# Patient Record
Sex: MALE | Race: WHITE
[De-identification: names, ages, dates, MRNs, and addresses within clinical notes are randomized per-mention and may not be internally consistent; named-entity substitution may affect disease eponyms.]

---

## 2018-07-08 ENCOUNTER — HOSPITAL ENCOUNTER (EMERGENCY)
Dept: HOSPITAL 89 - ER | Age: 53
Discharge: HOME | End: 2018-07-08
Payer: COMMERCIAL

## 2018-07-08 VITALS — SYSTOLIC BLOOD PRESSURE: 156 MMHG | DIASTOLIC BLOOD PRESSURE: 112 MMHG

## 2018-07-08 DIAGNOSIS — R00.0: ICD-10-CM

## 2018-07-08 DIAGNOSIS — S30.1XXA: Primary | ICD-10-CM

## 2018-07-08 LAB — PLATELET COUNT, AUTOMATED: 264 K/UL (ref 150–450)

## 2018-07-08 PROCEDURE — 84460 ALANINE AMINO (ALT) (SGPT): CPT

## 2018-07-08 PROCEDURE — 96375 TX/PRO/DX INJ NEW DRUG ADDON: CPT

## 2018-07-08 PROCEDURE — 82565 ASSAY OF CREATININE: CPT

## 2018-07-08 PROCEDURE — 83690 ASSAY OF LIPASE: CPT

## 2018-07-08 PROCEDURE — 82247 BILIRUBIN TOTAL: CPT

## 2018-07-08 PROCEDURE — 96374 THER/PROPH/DIAG INJ IV PUSH: CPT

## 2018-07-08 PROCEDURE — 82435 ASSAY OF BLOOD CHLORIDE: CPT

## 2018-07-08 PROCEDURE — 84450 TRANSFERASE (AST) (SGOT): CPT

## 2018-07-08 PROCEDURE — 74177 CT ABD & PELVIS W/CONTRAST: CPT

## 2018-07-08 PROCEDURE — 85025 COMPLETE CBC W/AUTO DIFF WBC: CPT

## 2018-07-08 PROCEDURE — 71260 CT THORAX DX C+: CPT

## 2018-07-08 PROCEDURE — 82947 ASSAY GLUCOSE BLOOD QUANT: CPT

## 2018-07-08 PROCEDURE — 84132 ASSAY OF SERUM POTASSIUM: CPT

## 2018-07-08 PROCEDURE — 82040 ASSAY OF SERUM ALBUMIN: CPT

## 2018-07-08 PROCEDURE — 99284 EMERGENCY DEPT VISIT MOD MDM: CPT

## 2018-07-08 PROCEDURE — 82374 ASSAY BLOOD CARBON DIOXIDE: CPT

## 2018-07-08 PROCEDURE — 84295 ASSAY OF SERUM SODIUM: CPT

## 2018-07-08 PROCEDURE — 93005 ELECTROCARDIOGRAM TRACING: CPT

## 2018-07-08 PROCEDURE — 84155 ASSAY OF PROTEIN SERUM: CPT

## 2018-07-08 PROCEDURE — 84520 ASSAY OF UREA NITROGEN: CPT

## 2018-07-08 PROCEDURE — 84075 ASSAY ALKALINE PHOSPHATASE: CPT

## 2018-07-08 PROCEDURE — 82310 ASSAY OF CALCIUM: CPT

## 2018-07-08 NOTE — ER REPORT
History and Physical


Time Seen By MD:  11:45


HPI/ROS


52 y/o male was helping move furniture when he fell into a solid object casuing 

an abdominal hematoma yesterday, No with pain. and nausea


Remainder of the 14 system rev:  Yes


Allergies:  


Coded Allergies:  


     Propoxyphene (Verified  Allergy, Mild, RASH, 10/12/10)


     Tramadol (Verified  Allergy, Mild, RASH, 10/12/10)


     Tetracycline (Verified  Adverse Reaction, Severe, NAUSEA VOMITING, 10/12/10

)


Uncoded Allergies:  


     INAPSINE (Adverse Reaction, Severe, RAGE, 7/1/09)


Home Meds


Active Scripts


Ibuprofen (IBUPROFEN) 600 Mg Tablet, 1 TAB PO Q6H for 10 Days, #30 TAB


   Prov:KAYLEE GILLIS MD         7/8/18


Oxycodone Hcl/Acetaminophen (OXYCODONE-ACETAMINOPHEN 5-325) 1 Each Tablet, 1 

EACH PO Q6H for 3 Days, #10 TAB


   Prov:KAYLEE GILLIS MD         7/8/18


Reported Medications


Oxycodone/Acetaminophen (OXYCODONE/ACETAMINOPHEN 5MG/325 MG) 5 Mg/325 Mg Tab, 1 

TAB PO Q4-6H, #30 0 Refills


   10/12/10


Ibuprofen (Motrin) 800 Mg Tab, 800 MG PO Q8H, 0 Refills


   10/12/10


Reviewed Nurses Notes:  Yes


Old Medical Records Reviewed:  Yes


Hx Substance Use Disorder:  No


Hx Alcohol Use:  Yes (RARE)


Constitutional





Physical Exam


A/Ox3 in NAD


CV: RRR n o m/r/g


Lungs: cta b/l


Abdomen: soft with ecchymoses to LUQ with TTP of LUQ and lower left ribs


MSK: no flank TTP





Medical Decision Making


Data Points


Laboratory





Hematology








Test


  7/8/18


12:00


 


Red Blood Count


  5.26 M/uL


(4.00-5.60)


 


Mean Corpuscular Volume


  93.6 fL


(80.0-96.0)


 


Mean Corpuscular Hemoglobin


  32.8 pg


(26.0-33.0)


 


Mean Corpuscular Hemoglobin


Concent 35.1 g/dL


(32.0-36.0)


 


Red Cell Distribution Width


  15.0 %


(11.5-14.5)


 


Mean Platelet Volume


  7.1 fL


(7.2-11.1)


 


Neutrophils (%) (Auto)


  64.5 %


(39.4-72.5)


 


Lymphocytes (%) (Auto)


  23.8 %


(17.6-49.6)


 


Monocytes (%) (Auto)


  11.2 %


(4.1-12.4)


 


Eosinophils (%) (Auto)


  0.3 %


(0.4-6.7)


 


Basophils (%) (Auto)


  0.2 %


(0.3-1.4)


 


Nucleated RBC Relative Count


(auto) 0.0 /100WBC 


 


 


Neutrophils # (Auto)


  6.3 K/uL


(2.0-7.4)


 


Lymphocytes # (Auto)


  2.3 K/uL


(1.3-3.6)


 


Monocytes # (Auto)


  1.1 K/uL


(0.3-1.0)


 


Eosinophils # (Auto)


  0.0 K/uL


(0.0-0.5)


 


Basophils # (Auto)


  0.0 K/uL


(0.0-0.1)


 


Nucleated RBC Absolute Count


(auto) 0.00 K/uL 


 


 


Peripheral Blood Smear No Y/N 


 


Sodium Level


  139 mmol/L


(137-145)


 


Potassium Level


  3.9 mmol/L


(3.5-5.0)


 


Chloride Level


  99 mmol/L


()


 


Carbon Dioxide Level


  29 mmol/L


(22-30)


 


Blood Urea Nitrogen


  14 mg/dl


(9-21)


 


Creatinine


  1.20 mg/dl


(0.66-1.25)


 


Glomerular Filtration Rate


Calc > 60.0 


 


 


Random Glucose


  95 mg/dl


()


 


Calcium Level


  9.0 mg/dl


(8.4-10.2)


 


Total Bilirubin


  0.6 mg/dl


(0.2-1.3)


 


Aspartate Amino Transf


(AST/SGOT) 35 U/L (0-35) 


 


 


Alanine Aminotransferase


(ALT/SGPT) 80 U/L (0-56) 


 


 


Alkaline Phosphatase 63 U/L (0-126) 


 


Total Protein


  7.1 g/dl


(6.3-8.2)


 


Albumin


  4.2 g/dl


(3.5-5.0)


 


Lipase


  260 U/L


()








Chemistry








Test


  7/8/18


12:00


 


White Blood Count


  9.8 k/uL


(4.5-11.0)


 


Red Blood Count


  5.26 M/uL


(4.00-5.60)


 


Hemoglobin


  17.3 g/dL


(14.0-18.0)


 


Hematocrit


  49.2 %


(42.0-52.0)


 


Mean Corpuscular Volume


  93.6 fL


(80.0-96.0)


 


Mean Corpuscular Hemoglobin


  32.8 pg


(26.0-33.0)


 


Mean Corpuscular Hemoglobin


Concent 35.1 g/dL


(32.0-36.0)


 


Red Cell Distribution Width


  15.0 %


(11.5-14.5)


 


Platelet Count


  264 K/uL


(150-450)


 


Mean Platelet Volume


  7.1 fL


(7.2-11.1)


 


Neutrophils (%) (Auto)


  64.5 %


(39.4-72.5)


 


Lymphocytes (%) (Auto)


  23.8 %


(17.6-49.6)


 


Monocytes (%) (Auto)


  11.2 %


(4.1-12.4)


 


Eosinophils (%) (Auto)


  0.3 %


(0.4-6.7)


 


Basophils (%) (Auto)


  0.2 %


(0.3-1.4)


 


Nucleated RBC Relative Count


(auto) 0.0 /100WBC 


 


 


Neutrophils # (Auto)


  6.3 K/uL


(2.0-7.4)


 


Lymphocytes # (Auto)


  2.3 K/uL


(1.3-3.6)


 


Monocytes # (Auto)


  1.1 K/uL


(0.3-1.0)


 


Eosinophils # (Auto)


  0.0 K/uL


(0.0-0.5)


 


Basophils # (Auto)


  0.0 K/uL


(0.0-0.1)


 


Nucleated RBC Absolute Count


(auto) 0.00 K/uL 


 


 


Peripheral Blood Smear No Y/N 


 


Glomerular Filtration Rate


Calc > 60.0 


 


 


Calcium Level


  9.0 mg/dl


(8.4-10.2)


 


Total Bilirubin


  0.6 mg/dl


(0.2-1.3)


 


Aspartate Amino Transf


(AST/SGOT) 35 U/L (0-35) 


 


 


Alanine Aminotransferase


(ALT/SGPT) 80 U/L (0-56) 


 


 


Alkaline Phosphatase 63 U/L (0-126) 


 


Total Protein


  7.1 g/dl


(6.3-8.2)


 


Albumin


  4.2 g/dl


(3.5-5.0)


 


Lipase


  260 U/L


()











ED Course/Re-evaluation


ED Course


In route from [facility] to [place]. Fell against object while moving 

furniture yesterday and sustained abdominal wall hematoma. CT scan/labs 

otherwise normal. No evidence of rib/splenic injury. Supportive care meds given.


Decision to Disposition Date:  Jul 8, 2018


Decision to Disposition Time:  14:40





Depart


Departure


Latest Vital Signs





Impression:  


 Primary Impression:  


 Abdominal wall contusion


Condition:  Improved


Disposition:  HOME OR SELF-CARE


New Scripts


Ibuprofen (IBUPROFEN) 600 Mg Tablet


1 TAB PO Q6H for 10 Days, #30 TAB


   Prov: KAYLEE GILLIS MD         7/8/18 


Oxycodone Hcl/Acetaminophen (OXYCODONE-ACETAMINOPHEN 5-325) 1 Each Tablet


1 EACH PO Q6H for 3 Days, #10 TAB


   Prov: KAYLEE GILLIS MD         7/8/18


Patient Instructions:  Rib Contusion (ED)





Additional Instructions:  


Obtain an MRI of your pancreas in the next 2-3 months. Avoid alcohol until you 

are fully healed





Problem Qualifiers








 Primary Impression:  


 Abdominal wall contusion


 Encounter type:  initial encounter  Qualified Codes:  S30.1XXA - Contusion of 

abdominal wall, initial encounter








KAYLEE GILLIS MD Jul 8, 2018 11:45

## 2018-07-08 NOTE — EKG
[place] [facility] [place] 

 

PATIENT NAME: STANLEY HAGEN

: [id number]

MR: [id number]

V: [id number]

EXAM DATE: 

ORDERING PHYSICIAN: KAYLEE GILLIS

TECHNOLOGIST: ROBIN

 

Test Reason : 

Blood Pressure : ***/*** mmHG

Vent. Rate : 103 BPM     Atrial Rate : 103 BPM

   P-R Int : 152 ms          QRS Dur : 104 ms

    QT Int : 324 ms       P-R-T Axes : 011 -44 007 degrees

   QTc Int : 424 ms

 

Sinus tachycardia

Left axis deviation

Cannot rule out Anterior infarct , age undetermined

T flattening consistent with inferior ischemia vs normal variant

No previous ECGs available

Confirmed by PURA FAYE ([id number]) on 2018 2:27:37 PM

 

Referred By:  CARLIN           Confirmed By:PURA FAYE